# Patient Record
Sex: FEMALE | Race: WHITE
[De-identification: names, ages, dates, MRNs, and addresses within clinical notes are randomized per-mention and may not be internally consistent; named-entity substitution may affect disease eponyms.]

---

## 2017-10-19 ENCOUNTER — HOSPITAL ENCOUNTER (EMERGENCY)
Dept: HOSPITAL 47 - EC | Age: 52
Discharge: HOME | End: 2017-10-19
Payer: COMMERCIAL

## 2017-10-19 VITALS — HEART RATE: 89 BPM | SYSTOLIC BLOOD PRESSURE: 178 MMHG | DIASTOLIC BLOOD PRESSURE: 79 MMHG

## 2017-10-19 VITALS — TEMPERATURE: 98.1 F

## 2017-10-19 VITALS — RESPIRATION RATE: 16 BRPM

## 2017-10-19 DIAGNOSIS — N20.1: Primary | ICD-10-CM

## 2017-10-19 DIAGNOSIS — Z79.899: ICD-10-CM

## 2017-10-19 LAB
ALP SERPL-CCNC: 82 U/L (ref 38–126)
ALT SERPL-CCNC: 28 U/L (ref 9–52)
AMYLASE SERPL-CCNC: 43 U/L (ref 30–110)
ANION GAP SERPL CALC-SCNC: 13 MMOL/L
AST SERPL-CCNC: 22 U/L (ref 14–36)
BASOPHILS # BLD AUTO: 0 K/UL (ref 0–0.2)
BASOPHILS NFR BLD AUTO: 1 %
BUN SERPL-SCNC: 13 MG/DL (ref 7–17)
CALCIUM SPEC-MCNC: 9.3 MG/DL (ref 8.4–10.2)
CH: 29.7
CHCM: 33.1
CHLORIDE SERPL-SCNC: 109 MMOL/L (ref 98–107)
CO2 SERPL-SCNC: 22 MMOL/L (ref 22–30)
EOSINOPHIL # BLD AUTO: 0.1 K/UL (ref 0–0.7)
EOSINOPHIL NFR BLD AUTO: 1 %
ERYTHROCYTE [DISTWIDTH] IN BLOOD BY AUTOMATED COUNT: 4.93 M/UL (ref 3.8–5.4)
ERYTHROCYTE [DISTWIDTH] IN BLOOD: 13.4 % (ref 11.5–15.5)
GLUCOSE SERPL-MCNC: 155 MG/DL (ref 74–99)
HCT VFR BLD AUTO: 44.5 % (ref 34–46)
HDW: 2.57
HGB BLD-MCNC: 14.6 GM/DL (ref 11.4–16)
LUC NFR BLD AUTO: 1 %
LYMPHOCYTES # SPEC AUTO: 0.8 K/UL (ref 1–4.8)
LYMPHOCYTES NFR SPEC AUTO: 15 %
MCH RBC QN AUTO: 29.6 PG (ref 25–35)
MCHC RBC AUTO-ENTMCNC: 32.8 G/DL (ref 31–37)
MCV RBC AUTO: 90.3 FL (ref 80–100)
MONOCYTES # BLD AUTO: 0.3 K/UL (ref 0–1)
MONOCYTES NFR BLD AUTO: 5 %
NEUTROPHILS # BLD AUTO: 4.6 K/UL (ref 1.3–7.7)
NEUTROPHILS NFR BLD AUTO: 79 %
NON-AFRICAN AMERICAN GFR(MDRD): 60
PARTICLE COUNT: 1643
PH UR: 7.5 [PH] (ref 5–8)
POTASSIUM SERPL-SCNC: 3.8 MMOL/L (ref 3.5–5.1)
PROT SERPL-MCNC: 7.6 G/DL (ref 6.3–8.2)
RBC UR QL: 163 /HPF (ref 0–5)
SODIUM SERPL-SCNC: 144 MMOL/L (ref 137–145)
SP GR UR: 1.05 (ref 1–1.03)
SQUAMOUS UR QL AUTO: 9 /HPF (ref 0–4)
UA BILLING (MACRO VS. MICRO): (no result)
UROBILINOGEN UR QL STRIP: <2 MG/DL (ref ?–2)
WBC # BLD AUTO: 0.05 10*3/UL
WBC # BLD AUTO: 5.8 K/UL (ref 3.8–10.6)
WBC #/AREA URNS HPF: 2 /HPF (ref 0–5)
WBC (PEROX): 5.86

## 2017-10-19 PROCEDURE — 81001 URINALYSIS AUTO W/SCOPE: CPT

## 2017-10-19 PROCEDURE — 74177 CT ABD & PELVIS W/CONTRAST: CPT

## 2017-10-19 PROCEDURE — 82150 ASSAY OF AMYLASE: CPT

## 2017-10-19 PROCEDURE — 85025 COMPLETE CBC W/AUTO DIFF WBC: CPT

## 2017-10-19 PROCEDURE — 96375 TX/PRO/DX INJ NEW DRUG ADDON: CPT

## 2017-10-19 PROCEDURE — 83605 ASSAY OF LACTIC ACID: CPT

## 2017-10-19 PROCEDURE — 36415 COLL VENOUS BLD VENIPUNCTURE: CPT

## 2017-10-19 PROCEDURE — 87086 URINE CULTURE/COLONY COUNT: CPT

## 2017-10-19 PROCEDURE — 87040 BLOOD CULTURE FOR BACTERIA: CPT

## 2017-10-19 PROCEDURE — 96372 THER/PROPH/DIAG INJ SC/IM: CPT

## 2017-10-19 PROCEDURE — 99284 EMERGENCY DEPT VISIT MOD MDM: CPT

## 2017-10-19 PROCEDURE — 83690 ASSAY OF LIPASE: CPT

## 2017-10-19 PROCEDURE — 80053 COMPREHEN METABOLIC PANEL: CPT

## 2017-10-19 PROCEDURE — 96374 THER/PROPH/DIAG INJ IV PUSH: CPT

## 2017-10-19 PROCEDURE — 96376 TX/PRO/DX INJ SAME DRUG ADON: CPT

## 2017-10-19 PROCEDURE — 96361 HYDRATE IV INFUSION ADD-ON: CPT

## 2017-10-19 NOTE — CT
EXAMINATION TYPE: CT abdomen pelvis w con

 

DATE OF EXAM: 10/19/2017

 

COMPARISON: NONE

 

HISTORY: 52-year-old female complains of RLQ pain, nausea, vomiting, and diarrhea.

 

TECHNIQUE: Contiguous axial scanning of the abdomen and pelvis following administration of 100 ml Omn
ipaque 300 IV contrast.  Delayed images through the kidneys and coronal/sagittal reconstructions perf
ormed.

 

CT DLP: 1468.8 mGycm

Automated exposure control for dose reduction was used.

 

 

FINDINGS:

Heart is normal size without pericardial effusion. Nonspecific 4 mm left basilar pulmonary nodule. No
 pleural effusion.

 

Small hiatal hernia.

 

Small amount of focal fat along the anterior falciform ligament and a 1.8 cm lobulated hypodense lesi
on in the central inferior right liver lobe most suggestive of a cyst. The portal venous system appea
rs patent. No biliary ductal dilatation.

 

Adrenal glands, left kidney, spleen, and pancreas appear within normal limits.

 

There is mild right-sided hydronephrosis with delayed excretion of contrast and mild hydroureter and 
a 5 mm calculus at the distal right ureter just proximal to the UVJ. Mild surrounding tracking edema 
and inflammation along the right ureter.

 

No dilated small bowel, free fluid, or free air. No mesenteric or retroperitoneal adenopathy.

 

Normal appendix. No significant stool burden. Minimal diverticular change along the proximal sigmoid.
 No pericolonic inflammatory change.

 

Bladder nondistended.

 

There is a intramural and subserosal focal fibroid along the anterior uterine body measuring 2.2 cm. 
Uterus is anteverted. Both ovaries are visualized. No abnormal fluid collection in the pelvis or pelv
ic lymphadenopathy.

 

Bones: Mild degenerative disc disease L5-S1. No osseous destructive process.

 

 

 

IMPRESSION: 

 

1. A 5 MM DISTAL RIGHT URETERAL CALCULUS NEAR THE UVJ WITH MILD HYDRONEPHROSIS. SIGNIFICANT OBSTRUCTI
VE UROPATHY IS SUGGESTED GIVEN THE LACK OF CONTRAST EXCRETION FROM THE RIGHT KIDNEY ON THE DELAYED IM
AGES.

2. MINIMAL DIVERTICULOSIS ALONG THE PROXIMAL SIGMOID COLON.

3. A 2.2 CM ANTERIOR UTERINE FIBROID.

## 2017-10-20 ENCOUNTER — HOSPITAL ENCOUNTER (OUTPATIENT)
Dept: HOSPITAL 47 - RADXRMAIN | Age: 52
Discharge: HOME | End: 2017-10-20
Payer: COMMERCIAL

## 2017-10-20 DIAGNOSIS — N28.89: Primary | ICD-10-CM

## 2017-10-20 PROCEDURE — 74000: CPT

## 2017-10-20 NOTE — XR
EXAMINATION TYPE: XR abdomen 1V

 

DATE OF EXAM: 10/20/2017

 

COMPARISON: 10/19/2017

 

HISTORY: Right-sided kidney stone

 

TECHNIQUE: One view abdominal series

 

FINDINGS:  

The osseous structures are intact.  The bowel gas pattern is nonspecific. Lung bases are clear.  

 

There remains a calcification along the right hemipelvis which could be within the right UVJ or bladd
er. Measures approximately 4 mm. No additional suspicious calcifications.

 

IMPRESSION:  

1. Right pelvic calcification likely near the right UVJ and corresponding to the CT abnormality.

## 2020-01-15 ENCOUNTER — HOSPITAL ENCOUNTER (OUTPATIENT)
Dept: HOSPITAL 47 - ORWHC2ENDO | Age: 55
Discharge: HOME | End: 2020-01-15
Attending: INTERNAL MEDICINE
Payer: COMMERCIAL

## 2020-01-15 VITALS — RESPIRATION RATE: 16 BRPM

## 2020-01-15 VITALS — TEMPERATURE: 97.9 F

## 2020-01-15 VITALS — BODY MASS INDEX: 35.5 KG/M2

## 2020-01-15 VITALS — HEART RATE: 77 BPM | DIASTOLIC BLOOD PRESSURE: 74 MMHG | SYSTOLIC BLOOD PRESSURE: 141 MMHG

## 2020-01-15 DIAGNOSIS — I10: ICD-10-CM

## 2020-01-15 DIAGNOSIS — Z12.11: Primary | ICD-10-CM

## 2020-01-15 DIAGNOSIS — Z87.442: ICD-10-CM

## 2020-01-15 DIAGNOSIS — Z79.899: ICD-10-CM

## 2020-01-15 NOTE — P.PCN
Date of Procedure: 01/15/20


Procedure(s) Performed: 


BRIEF HISTORY: Patient is a 54-year-old pleasant female scheduled for an 

elective colonoscopy as a part of screening for colon rectal neoplasia.





PROCEDURE PERFORMED: Colonoscopy. 





PREOPERATIVE DIAGNOSIS: Screening for colon cancer. 





IV sedation per Anesthesia. 





PROCEDURE: After informed consent was obtained, the patient, was brought into 

the endoscopy unit. IV sedation was administered by Anesthesia under continuous 

monitoring.  Digital rectal examination was normal. Initially the Olympus CF-160

flexible video colonoscope was then inserted in the rectum, gradually advanced 

into the cecum without any difficulty. Careful examination was performed as the 

scope was gradually being withdrawn. Ileocecal valve and the appendiceal orifice

were visualized and appeared normal.  Prep was excellent. Mucosa of the cecum, 

ascending colon, transverse colon, descending colon, sigmoid colon, and rectum 

appeared normal. Retroflexion was performed in the rectum and no lesions were 

seen. The patient tolerated the procedure well. 





IMPRESSION: Normal-appearing colon from rectum to cecum with no emesis of 

colorectal neoplasia .





RECOMMENDATIONS:  Findings of this examination were discussed with the patient 

as well as a family.  She was advised to have a repeat scanning colonoscopy in 

10 years.

## 2020-08-25 ENCOUNTER — HOSPITAL ENCOUNTER (OUTPATIENT)
Dept: HOSPITAL 47 - RADMAMWWP | Age: 55
Discharge: HOME | End: 2020-08-25
Attending: INTERNAL MEDICINE
Payer: COMMERCIAL

## 2020-08-25 DIAGNOSIS — Z12.31: Primary | ICD-10-CM

## 2020-08-25 PROCEDURE — 77067 SCR MAMMO BI INCL CAD: CPT

## 2020-08-27 NOTE — MM
Reason for exam: screening  (asymptomatic).

Last mammogram was performed 1 year and 5 months ago.



History:

Patient is postmenopausal.

Family history of breast cancer in maternal grandmother.

Took hormonal contraceptives for 2 years.



Physical Findings:

A clinical breast exam by your physician is recommended on an annual basis and 

results should be correlated with mammographic findings.



MG Screening Mammo w CAD

Bilateral CC and MLO view(s) were taken.

Prior study comparison: March 25, 2019, right breast MG work up mamm w CAD RT.  

March 25, 2019, bilateral MG screening mammo w CAD.

There are scattered fibroglandular densities.  No significant changes when 

compared with prior studies.





ASSESSMENT: Benign, BI-RAD 2



RECOMMENDATION:

Routine screening mammogram of both breasts in 6 months.

## 2021-09-03 ENCOUNTER — HOSPITAL ENCOUNTER (OUTPATIENT)
Dept: HOSPITAL 47 - RADMAMWWP | Age: 56
Discharge: HOME | End: 2021-09-03
Attending: INTERNAL MEDICINE
Payer: COMMERCIAL

## 2021-09-03 DIAGNOSIS — Z80.3: ICD-10-CM

## 2021-09-03 DIAGNOSIS — Z79.3: ICD-10-CM

## 2021-09-03 DIAGNOSIS — Z78.0: ICD-10-CM

## 2021-09-03 DIAGNOSIS — Z12.31: Primary | ICD-10-CM

## 2021-09-03 PROCEDURE — 77067 SCR MAMMO BI INCL CAD: CPT

## 2021-09-07 NOTE — MM
Reason for exam: screening  (asymptomatic).

Last mammogram was performed 1 year ago.



History:

Patient is postmenopausal.

Family history of breast cancer in maternal grandmother.

Took hormonal contraceptives for 2 years.



Physical Findings:

A clinical breast exam by your physician is recommended on an annual basis and 

results should be correlated with mammographic findings.



MG Screening Mammo w CAD

Bilateral CC and MLO view(s) were taken.

Prior study comparison: August 25, 2020, bilateral MG screening mammo w CAD.  

March 25, 2019, right breast MG work up mamm w CAD RT.

There are scattered fibroglandular densities.  There is chronic nodularity in the 

right breast. There is no discrete abnormality.





ASSESSMENT: Benign, BI-RAD 2



RECOMMENDATION:

Routine screening mammogram of both breasts in 1 year.

## 2022-02-07 ENCOUNTER — HOSPITAL ENCOUNTER (OUTPATIENT)
Dept: HOSPITAL 47 - RADXRMAIN | Age: 57
Discharge: HOME | End: 2022-02-07
Attending: INTERNAL MEDICINE
Payer: COMMERCIAL

## 2022-02-07 DIAGNOSIS — M47.812: Primary | ICD-10-CM

## 2022-02-07 DIAGNOSIS — M99.51: ICD-10-CM

## 2022-02-07 PROCEDURE — 72050 X-RAY EXAM NECK SPINE 4/5VWS: CPT

## 2022-02-07 NOTE — XR
EXAMINATION TYPE: XR cervical spine comp

 

DATE OF EXAM: 2/7/2022

 

COMPARISON: None

 

HISTORY: 56-year-old female M54.2, right-sided neck injury and arm pain since Friday.

 

TECHNIQUE: 6 views

 

FINDINGS:  

No prenatal space widening or prevertebral soft tissue swelling. Straightening of the normal cervical
 lordosis. There is preserved alignment of the cervical spine. No odontoid view.

 

Scattered mild facet and uncovertebral joint arthropathy.

 

On the right, changes contribute to mild bony neural foraminal narrowing in the mid cervical spine at
 C4-C5 and C5-C6 and on the left at C6-C7.

 

 

IMPRESSION:  

Mild multilevel spondylotic changes. Straightening of the normal cervical lordosis could be positiona
l or due to muscle spasm. No prevertebral soft tissue swelling or malalignment. Mild bony neuroforami
nal narrowing as outlined above.

## 2022-03-26 ENCOUNTER — HOSPITAL ENCOUNTER (OUTPATIENT)
Dept: HOSPITAL 47 - RADMRIMAIN | Age: 57
Discharge: HOME | End: 2022-03-26
Attending: INTERNAL MEDICINE
Payer: COMMERCIAL

## 2022-03-26 DIAGNOSIS — M50.222: ICD-10-CM

## 2022-03-26 DIAGNOSIS — M47.812: Primary | ICD-10-CM

## 2022-03-26 PROCEDURE — 72141 MRI NECK SPINE W/O DYE: CPT

## 2022-03-26 NOTE — MR
EXAMINATION TYPE: MR cervical spine wo con

 

DATE OF EXAM: 3/26/2022

 

COMPARISON: None

 

HISTORY: Pain in Neck and right upper arm since Feb 2022.

 

Multiplanar multiecho imaging of the cervical spine without contrast.

 

Vertebral abnormal alignment. There is some degenerative disc space narrowing at C3-4 and C4-5. Cervi
marleen spinal cord has normal signal pattern. No edema. There are small posterior disc bulging at C4-5 a
nd C5-6. Facet joints are intact. Brainstem is intact

 

There is small posterior disc bulge at C5-6 without evident impingement on the spinal canal. Spinal c
anal measures 7.5 mm at C5-6 which is the narrowest point. There is some encroachment on the right si
de C5-6 neural foramen.

 

IMPRESSION:

Spondylotic changes. Mild posterior disc bulging seen at C4-5 and C5-6 without spinal stenosis. Disc 
bulging slightly more to the right side at C5-6 and this could be clinically significant in this clarice
ent with right-sided symptoms.

## 2022-09-06 ENCOUNTER — HOSPITAL ENCOUNTER (OUTPATIENT)
Dept: HOSPITAL 47 - RADMAMWWP | Age: 57
Discharge: HOME | End: 2022-09-06
Attending: INTERNAL MEDICINE
Payer: COMMERCIAL

## 2022-09-06 DIAGNOSIS — Z80.3: ICD-10-CM

## 2022-09-06 DIAGNOSIS — Z12.31: Primary | ICD-10-CM

## 2022-09-06 DIAGNOSIS — Z78.0: ICD-10-CM

## 2022-09-06 PROCEDURE — 77067 SCR MAMMO BI INCL CAD: CPT

## 2022-09-07 NOTE — MM
Reason for Exam: Screening  (asymptomatic). 

Last screening mammogram was performed 12 month(s) ago.





Patient History: 

Menarche at age 10. First Full-Term Pregnancy at age 21. Postmenopausal. Patient has history of

breast feeding. Patient used Hormonal Contraceptives for 2 years.

Maternal grandmother had breast cancer at or over age 50. 





Risk Values: 

[place] 5 year model risk: 1.3%.

NCI Lifetime model risk: 7.7%.





Prior Study Comparison: 

3/25/2019 Right Diagnostic Mammogram, [facility]. 8/25/2020 Bilateral Screening Mammogram, [facility]. 9/3/2021

Bilateral Screening Mammogram, [facility]. 





Tissue Density: 

The breast tissue is heterogeneously dense. This may lower the sensitivity of mammography.





Findings: 

Analyzed By CAD. 

Focal asymmetry upper left MLO view.

This finding is changed when compared with previous exams. 





Overall Assessment: Incomplete: need additional imaging evaluation, BI-RAD 0





Management: 

Diagnostic Mammogram of the left breast.

A clinical breast exam by your physician is recommended on an annual basis and results should be

correlated with mammographic findings.

[facility] will attempt to contact patient to return for supplemental views.



Electronically signed and approved by: Leopold M. Fregoli, M.D. Radiologis

## 2022-09-12 ENCOUNTER — HOSPITAL ENCOUNTER (OUTPATIENT)
Dept: HOSPITAL 47 - RADMAMWWP | Age: 57
Discharge: HOME | End: 2022-09-12
Attending: INTERNAL MEDICINE
Payer: COMMERCIAL

## 2022-09-12 DIAGNOSIS — Z80.3: ICD-10-CM

## 2022-09-12 DIAGNOSIS — Z78.0: ICD-10-CM

## 2022-09-12 DIAGNOSIS — R92.8: Primary | ICD-10-CM

## 2022-09-12 PROCEDURE — 77065 DX MAMMO INCL CAD UNI: CPT

## 2022-09-12 NOTE — MM
Reason for Exam: Additional evaluation requested from abnormal screening. 

Last screening mammogram was performed less than 1 month ago.





Patient History: 

Menarche at age 10. First Full-Term Pregnancy at age 21. Postmenopausal. Patient has history of

breast feeding. Patient used Hormonal Contraceptives for 2 years.

Maternal grandmother had breast cancer at or over age 50. 





Risk Values: 

[place] 5 year model risk: 1.3%.

NCI Lifetime model risk: 7.7%.





Prior Study Comparison: 

8/25/2020 Bilateral Screening Mammogram, [facility]. 9/3/2021 Bilateral Screening Mammogram, [facility]. 9/6/2022

Bilateral MG screening mammo w CAD, [facility]. 





Tissue Density: 

Left: The breast tissue is heterogeneously dense. This may lower the sensitivity of mammography.





Findings: 

Analyzed By CAD. 

The asymmetry does not persist on spot compression views. No worrisome cluster microcalcifications. 





Overall Assessment: Negative, BI-RAD 1





Management: 

Screening Mammogram of both breasts in 1 year.

A clinical breast exam by your physician is recommended on an annual basis and results should be

correlated with mammographic findings.  This exam should not preclude additional follow-up of

suspicious palpable abnormalities.  Results were given to the patient verbally at the time of exam.



Electronically signed and approved by: Asael Garner D.O.

## 2023-09-07 ENCOUNTER — HOSPITAL ENCOUNTER (OUTPATIENT)
Dept: HOSPITAL 47 - RADMAMWWP | Age: 58
Discharge: HOME | End: 2023-09-07
Attending: INTERNAL MEDICINE
Payer: COMMERCIAL

## 2023-09-07 DIAGNOSIS — Z80.3: ICD-10-CM

## 2023-09-07 DIAGNOSIS — M85.88: ICD-10-CM

## 2023-09-07 DIAGNOSIS — Z12.31: Primary | ICD-10-CM

## 2023-09-07 DIAGNOSIS — Z78.0: ICD-10-CM

## 2023-09-07 PROCEDURE — 77080 DXA BONE DENSITY AXIAL: CPT

## 2023-09-07 PROCEDURE — 77067 SCR MAMMO BI INCL CAD: CPT

## 2023-09-07 NOTE — BD
EXAMINATION TYPE: Axial Bone Density

 

DATE OF EXAM: 9/7/2023

 

CLINICAL HISTORY: 58 years old Female.  ICD-10 CODE: Z12.31 N951

 

Height:  63.8

Weight:  222

 

FRAX RISK QUESTIONS:

Glucocorticoids (More than 3mos):  yes, for allergies

           (Ex: prednisone, prednisolone, methylprednisolone, dexamethasone, and hydrocortisone).    
     

 

RISK FACTORS 

HISTORY OF: 

Postmenopausal woman: yes, at age 52 yrs old

Hyperparathyroidism: no

Adrenal Insufficiency: no

 

MEDICATIONS: 

Prednisone or other steroids: flonase, 

Additional Medications: bp meds, zyrtec, vit d 

Additional History: hypertension, allergies, D deficient,    

 

EXAM MEASUREMENTS: 

Bone mineral densitometry was performed using the [employer] System.

Bone mineral density as measured about the Lumbar spine is:  

----- L1-L4(G/cm2):   1.181

T Score Values are as follows:

----- L1:   -0.3

----- L2:   -0.5

----- L3:    0.2

----- L4:    0.4

----- L1-L4:    0.0

 

Z Score Values are as follows:

----- L1:    -0.4

----- L2:    -0.6

----- L3:     0.1

----- L4:     0.3

----- L1-L4:     -0.1

 

Bone mineral density is a baseline study for this patient.

 

Bone mineral density about the R hip (g/cm2): 1.021

Bone mineral density about the L hip (g/cm2):  1.020

T Score values are as follows:

-----R Neck:   -0.5

-----L Neck:   -1.1

-----R Total:    0.1

-----L Total:    0.1

 

Z Score values are as follows:

-----R Neck:   -0.1

-----L Neck:   -0.7

-----R Total:    0.1

-----L Total:    0.1

 

Bone mineral density is the patients first dexa scan.....baseline study.

 

FRAX%s: The graph provided illustrates a 9.8% chance for a major osteoporotic fx and a 0.6% chance fo
r the hips probability for fx in 10 years time.

 

 

 

 

IMPRESSION:

Osteopenia (T Score between -2.5 and -1).

 

There is slightly increased risk of fracture and the patient may be considered 

for treatment. 

 

Re-Screen 2-5 years.

 

NOTE:  T-SCORE=SD OF THE YOUNG ADULT MEAN.

## 2023-09-08 NOTE — MM
Reason for Exam: Screening  (asymptomatic). 

Last screening mammogram was performed 12 month(s) ago.





Patient History: 

Menarche at age 10. First Full-Term Pregnancy at age 21. Postmenopausal. Patient has history of

breast feeding. Patient used Hormonal Contraceptives for 2 years.

Maternal grandmother had breast cancer at or over age 50. 





Risk Values: 

[place] 5 year model risk: 1.3%.

NCI Lifetime model risk: 7.6%.





Prior Study Comparison: 

9/3/2021 Bilateral Screening Mammogram, [facility]. 9/6/2022 Bilateral MG screening mammo w CAD, [facility].

9/12/2022 Left MG work up mamm w CAD LT, [facility]. 





Tissue Density: 

There are scattered fibroglandular densities.





Findings: 

Analyzed By CAD. 

There is no suspicious group of microcalcifications or new suspicious mass. 





Overall Assessment: Negative, BI-RAD 1





Management: 

Screening Mammogram of both breasts in 1 year.

[facility] will attempt to contact patient to return for supplemental views and

ultrasound if indicated.



Patient should continue monthly self-breast exams.  A clinical breast exam by your physician is

recommended on an annual basis.

This exam should not preclude additional follow-up of suspicious palpable abnormalities.



Note on [place] scores and lifetime risk:

1. A [place] score greater than 3% is considered moderate risk. If this is the case, consider

specialist referral to assess eligibility for a risk reducing agent.

2. If overall lifetime risk for the development of breast cancer is 20% or higher, the patient may

qualify for future screening with alternating mammogram and breast MRI.



Electronically signed and approved by: Jarred Keene DO

## 2024-09-09 ENCOUNTER — HOSPITAL ENCOUNTER (OUTPATIENT)
Dept: HOSPITAL 47 - RADMAMWWP | Age: 59
Discharge: HOME | End: 2024-09-09
Attending: INTERNAL MEDICINE
Payer: COMMERCIAL

## 2024-09-09 PROCEDURE — 77063 BREAST TOMOSYNTHESIS BI: CPT

## 2024-09-09 PROCEDURE — 77067 SCR MAMMO BI INCL CAD: CPT

## 2024-09-09 NOTE — MM
Reason for Exam: Screening  (asymptomatic). 

Last screening mammogram was performed 12 month(s) ago.





Patient History: 

Menarche at age 10. First Full-Term Pregnancy at age 21. Postmenopausal. Patient has history of

breast feeding. Patient used Hormonal Contraceptives for 2 years.

Maternal grandmother had breast cancer at or over age 50. 





Risk Values: 

[place] 5 year model risk: 1.4%.

NCI Lifetime model risk: 7.4%.





Prior Study Comparison: 

9/6/2022 Bilateral MG screening mammo w CAD, [facility]. 9/12/2022 Left MG work up mamm w CAD LT, [facility].

9/7/2023 Bilateral MG screening mammo w CAD, [facility]. 





Tissue Density: 

There are scattered areas of fibroglandular density.





Findings: 

Analyzed By CAD. 

Suggestion of similar duct ectasia subareolar right breast. Asymmetric density superior right MLO

view is also unchanged.

There is no suspicious group of microcalcifications or new suspicious mass in either breast. 





Overall Assessment: Benign, BI-RAD 2





Management: 

Screening Mammogram of both breasts in 1 year.

.



Patient should continue monthly self-breast exams.  A clinical breast exam by your physician is

recommended on an annual basis.

This exam should not preclude additional follow-up of suspicious palpable abnormalities.



Note on [place] scores and lifetime risk:

1. A [place] score greater than 3% is considered moderate risk. If this is the case, consider

specialist referral to assess eligibility for a risk reducing agent.

2. If overall lifetime risk for the development of breast cancer is 20% or higher, the patient may

qualify for future screening with alternating mammogram and breast MRI.



Electronically signed and approved by: Frances Thacker M.D. Radiologist